# Patient Record
Sex: FEMALE | NOT HISPANIC OR LATINO
[De-identification: names, ages, dates, MRNs, and addresses within clinical notes are randomized per-mention and may not be internally consistent; named-entity substitution may affect disease eponyms.]

---

## 2019-02-05 LAB — CYTOLOGY CVX/VAG DOC THIN PREP: NORMAL

## 2019-02-11 ENCOUNTER — RECORD ABSTRACTING (OUTPATIENT)
Age: 54
End: 2019-02-11

## 2019-02-11 DIAGNOSIS — Z98.890 OTHER SPECIFIED POSTPROCEDURAL STATES: ICD-10-CM

## 2019-02-11 RX ORDER — ASPIRIN/ACETAMINOPHEN/CAFFEINE 250-250-65
325 (65 FE) TABLET ORAL DAILY
Refills: 0 | Status: ACTIVE | COMMUNITY

## 2019-02-11 RX ORDER — MAGNESIUM OXIDE 500 MG
500 TABLET ORAL
Refills: 0 | Status: ACTIVE | COMMUNITY

## 2019-02-11 RX ORDER — DOCOSAHEXAENOIC ACID 300 MG
50 MCG CAPSULE ORAL
Refills: 0 | Status: ACTIVE | COMMUNITY

## 2019-02-21 ENCOUNTER — APPOINTMENT (OUTPATIENT)
Dept: OBGYN | Facility: CLINIC | Age: 54
End: 2019-02-21
Payer: COMMERCIAL

## 2019-02-21 VITALS — SYSTOLIC BLOOD PRESSURE: 120 MMHG | WEIGHT: 145 LBS | DIASTOLIC BLOOD PRESSURE: 78 MMHG

## 2019-02-21 DIAGNOSIS — Z86.2 PERSONAL HISTORY OF DISEASES OF THE BLOOD AND BLOOD-FORMING ORGANS AND CERTAIN DISORDERS INVOLVING THE IMMUNE MECHANISM: ICD-10-CM

## 2019-02-21 DIAGNOSIS — Z78.9 OTHER SPECIFIED HEALTH STATUS: ICD-10-CM

## 2019-02-21 DIAGNOSIS — Z86.39 PERSONAL HISTORY OF OTHER ENDOCRINE, NUTRITIONAL AND METABOLIC DISEASE: ICD-10-CM

## 2019-02-21 DIAGNOSIS — Z83.3 FAMILY HISTORY OF DIABETES MELLITUS: ICD-10-CM

## 2019-02-21 DIAGNOSIS — Z82.49 FAMILY HISTORY OF ISCHEMIC HEART DISEASE AND OTHER DISEASES OF THE CIRCULATORY SYSTEM: ICD-10-CM

## 2019-02-21 DIAGNOSIS — Z86.79 PERSONAL HISTORY OF OTHER DISEASES OF THE CIRCULATORY SYSTEM: ICD-10-CM

## 2019-02-21 PROCEDURE — 99396 PREV VISIT EST AGE 40-64: CPT

## 2019-06-18 ENCOUNTER — TRANSCRIPTION ENCOUNTER (OUTPATIENT)
Age: 54
End: 2019-06-18

## 2020-02-27 ENCOUNTER — APPOINTMENT (OUTPATIENT)
Dept: OBGYN | Facility: CLINIC | Age: 55
End: 2020-02-27
Payer: COMMERCIAL

## 2020-02-27 VITALS
SYSTOLIC BLOOD PRESSURE: 118 MMHG | HEIGHT: 63 IN | WEIGHT: 123 LBS | BODY MASS INDEX: 21.79 KG/M2 | DIASTOLIC BLOOD PRESSURE: 72 MMHG

## 2020-02-27 PROCEDURE — 99396 PREV VISIT EST AGE 40-64: CPT

## 2020-02-27 RX ORDER — ATORVASTATIN CALCIUM 10 MG/1
10 TABLET, FILM COATED ORAL
Qty: 30 | Refills: 0 | Status: ACTIVE | COMMUNITY
Start: 2020-02-15

## 2020-06-10 ENCOUNTER — APPOINTMENT (OUTPATIENT)
Dept: INTERNAL MEDICINE | Facility: CLINIC | Age: 55
End: 2020-06-10
Payer: COMMERCIAL

## 2020-06-10 ENCOUNTER — NON-APPOINTMENT (OUTPATIENT)
Age: 55
End: 2020-06-10

## 2020-06-10 VITALS
HEIGHT: 63 IN | BODY MASS INDEX: 20.91 KG/M2 | HEART RATE: 76 BPM | DIASTOLIC BLOOD PRESSURE: 70 MMHG | SYSTOLIC BLOOD PRESSURE: 108 MMHG | OXYGEN SATURATION: 98 % | WEIGHT: 118 LBS

## 2020-06-10 PROCEDURE — 99386 PREV VISIT NEW AGE 40-64: CPT | Mod: 25

## 2020-06-10 PROCEDURE — 93000 ELECTROCARDIOGRAM COMPLETE: CPT | Mod: 59

## 2020-06-10 PROCEDURE — 36415 COLL VENOUS BLD VENIPUNCTURE: CPT

## 2020-06-10 PROCEDURE — G0442 ANNUAL ALCOHOL SCREEN 15 MIN: CPT

## 2020-06-10 RX ORDER — LOSARTAN POTASSIUM 100 MG/1
100 TABLET, FILM COATED ORAL DAILY
Qty: 90 | Refills: 0 | Status: ACTIVE | COMMUNITY
Start: 1900-01-01 | End: 1900-01-01

## 2020-06-17 ENCOUNTER — RESULT REVIEW (OUTPATIENT)
Age: 55
End: 2020-06-17

## 2020-06-18 LAB
25(OH)D3 SERPL-MCNC: 55.1 NG/ML
25(OH)D3 SERPL-MCNC: 60.2 NG/ML
ALBUMIN SERPL ELPH-MCNC: 4.6 G/DL
ALBUMIN SERPL ELPH-MCNC: 4.8 G/DL
ALP BLD-CCNC: 112 U/L
ALP BLD-CCNC: 117 U/L
ALT SERPL-CCNC: 13 U/L
ALT SERPL-CCNC: 15 U/L
ANION GAP SERPL CALC-SCNC: 14 MMOL/L
ANION GAP SERPL CALC-SCNC: 23 MMOL/L
AST SERPL-CCNC: 17 U/L
AST SERPL-CCNC: 18 U/L
BASOPHILS # BLD AUTO: 0.04 K/UL
BASOPHILS # BLD AUTO: 0.05 K/UL
BASOPHILS NFR BLD AUTO: 0.7 %
BASOPHILS NFR BLD AUTO: 0.9 %
BILIRUB SERPL-MCNC: 0.5 MG/DL
BILIRUB SERPL-MCNC: 0.5 MG/DL
BUN SERPL-MCNC: 11 MG/DL
BUN SERPL-MCNC: 11 MG/DL
CALCIUM SERPL-MCNC: 10.1 MG/DL
CALCIUM SERPL-MCNC: 9.8 MG/DL
CHLORIDE SERPL-SCNC: 103 MMOL/L
CHLORIDE SERPL-SCNC: 104 MMOL/L
CHOLEST SERPL-MCNC: 187 MG/DL
CHOLEST SERPL-MCNC: 190 MG/DL
CHOLEST/HDLC SERPL: 3.1 RATIO
CHOLEST/HDLC SERPL: 3.2 RATIO
CO2 SERPL-SCNC: 17 MMOL/L
CO2 SERPL-SCNC: 25 MMOL/L
CREAT SERPL-MCNC: 0.62 MG/DL
CREAT SERPL-MCNC: 0.68 MG/DL
EOSINOPHIL # BLD AUTO: 0.14 K/UL
EOSINOPHIL # BLD AUTO: 0.17 K/UL
EOSINOPHIL NFR BLD AUTO: 2.4 %
EOSINOPHIL NFR BLD AUTO: 2.8 %
ESTIMATED AVERAGE GLUCOSE: 140 MG/DL
ESTIMATED AVERAGE GLUCOSE: 146 MG/DL
GLUCOSE SERPL-MCNC: 114 MG/DL
GLUCOSE SERPL-MCNC: 116 MG/DL
HBA1C MFR BLD HPLC: 6.5 %
HBA1C MFR BLD HPLC: 6.7 %
HCT VFR BLD CALC: 44 %
HCT VFR BLD CALC: 45.2 %
HDLC SERPL-MCNC: 59 MG/DL
HDLC SERPL-MCNC: 61 MG/DL
HGB BLD-MCNC: 13.2 G/DL
HGB BLD-MCNC: 13.3 G/DL
IMM GRANULOCYTES NFR BLD AUTO: 0.2 %
IMM GRANULOCYTES NFR BLD AUTO: 0.2 %
LDLC SERPL CALC-MCNC: 108 MG/DL
LDLC SERPL CALC-MCNC: 108 MG/DL
LYMPHOCYTES # BLD AUTO: 1.52 K/UL
LYMPHOCYTES # BLD AUTO: 1.54 K/UL
LYMPHOCYTES NFR BLD AUTO: 25.5 %
LYMPHOCYTES NFR BLD AUTO: 26.4 %
MAN DIFF?: NORMAL
MAN DIFF?: NORMAL
MCHC RBC-ENTMCNC: 26.6 PG
MCHC RBC-ENTMCNC: 26.6 PG
MCHC RBC-ENTMCNC: 29.4 GM/DL
MCHC RBC-ENTMCNC: 30 GM/DL
MCV RBC AUTO: 88.5 FL
MCV RBC AUTO: 90.4 FL
MONOCYTES # BLD AUTO: 0.44 K/UL
MONOCYTES # BLD AUTO: 0.46 K/UL
MONOCYTES NFR BLD AUTO: 7.3 %
MONOCYTES NFR BLD AUTO: 8 %
NEUTROPHILS # BLD AUTO: 3.57 K/UL
NEUTROPHILS # BLD AUTO: 3.83 K/UL
NEUTROPHILS NFR BLD AUTO: 62.1 %
NEUTROPHILS NFR BLD AUTO: 63.5 %
PLATELET # BLD AUTO: 320 K/UL
PLATELET # BLD AUTO: 338 K/UL
POTASSIUM SERPL-SCNC: 5.1 MMOL/L
POTASSIUM SERPL-SCNC: 5.4 MMOL/L
PROT SERPL-MCNC: 6.9 G/DL
PROT SERPL-MCNC: 7.1 G/DL
RBC # BLD: 4.97 M/UL
RBC # BLD: 5 M/UL
RBC # FLD: 12.6 %
RBC # FLD: 12.8 %
SARS-COV-2 IGG SERPL IA-ACNC: <0.1 INDEX
SARS-COV-2 IGG SERPL QL IA: NEGATIVE
SODIUM SERPL-SCNC: 141 MMOL/L
SODIUM SERPL-SCNC: 144 MMOL/L
T4 SERPL-MCNC: 7.4 UG/DL
T4 SERPL-MCNC: 8.1 UG/DL
TRIGL SERPL-MCNC: 105 MG/DL
TRIGL SERPL-MCNC: 98 MG/DL
TSH SERPL-ACNC: 2.22 UIU/ML
TSH SERPL-ACNC: 2.35 UIU/ML
VIT B12 SERPL-MCNC: >2000 PG/ML
VIT B12 SERPL-MCNC: >2000 PG/ML
WBC # FLD AUTO: 5.75 K/UL
WBC # FLD AUTO: 6.03 K/UL

## 2020-06-24 ENCOUNTER — APPOINTMENT (OUTPATIENT)
Dept: INTERNAL MEDICINE | Facility: CLINIC | Age: 55
End: 2020-06-24
Payer: COMMERCIAL

## 2020-06-24 VITALS
HEIGHT: 63 IN | BODY MASS INDEX: 20.91 KG/M2 | HEART RATE: 72 BPM | OXYGEN SATURATION: 99 % | WEIGHT: 118 LBS | DIASTOLIC BLOOD PRESSURE: 62 MMHG | SYSTOLIC BLOOD PRESSURE: 106 MMHG

## 2020-06-24 PROCEDURE — 36415 COLL VENOUS BLD VENIPUNCTURE: CPT

## 2020-07-08 ENCOUNTER — TRANSCRIPTION ENCOUNTER (OUTPATIENT)
Age: 55
End: 2020-07-08

## 2020-07-22 LAB
HBV SURFACE AB SER QL: NONREACTIVE
HBV SURFACE AG SER QL: NONREACTIVE

## 2020-08-12 ENCOUNTER — APPOINTMENT (OUTPATIENT)
Dept: INTERNAL MEDICINE | Facility: CLINIC | Age: 55
End: 2020-08-12
Payer: COMMERCIAL

## 2020-08-12 DIAGNOSIS — Z11.59 ENCOUNTER FOR SCREENING FOR OTHER VIRAL DISEASES: ICD-10-CM

## 2020-08-12 PROCEDURE — 90471 IMMUNIZATION ADMIN: CPT

## 2020-08-12 PROCEDURE — 90636 HEP A/HEP B VACC ADULT IM: CPT

## 2020-08-13 PROBLEM — Z11.59 NEED FOR HEPATITIS B SCREENING TEST: Status: ACTIVE | Noted: 2020-06-25

## 2020-09-16 ENCOUNTER — APPOINTMENT (OUTPATIENT)
Dept: INTERNAL MEDICINE | Facility: CLINIC | Age: 55
End: 2020-09-16
Payer: COMMERCIAL

## 2020-09-16 VITALS
WEIGHT: 121 LBS | OXYGEN SATURATION: 98 % | BODY MASS INDEX: 21.44 KG/M2 | HEIGHT: 63 IN | SYSTOLIC BLOOD PRESSURE: 108 MMHG | DIASTOLIC BLOOD PRESSURE: 78 MMHG | HEART RATE: 98 BPM

## 2020-09-16 DIAGNOSIS — Z23 ENCOUNTER FOR IMMUNIZATION: ICD-10-CM

## 2020-09-16 PROCEDURE — 99214 OFFICE O/P EST MOD 30 MIN: CPT | Mod: 25

## 2020-09-16 PROCEDURE — 36415 COLL VENOUS BLD VENIPUNCTURE: CPT

## 2020-09-17 PROBLEM — Z23 ENCOUNTER FOR IMMUNIZATION: Status: ACTIVE | Noted: 2020-08-12

## 2020-09-22 LAB
ALBUMIN SERPL ELPH-MCNC: 4.6 G/DL
ALP BLD-CCNC: 95 U/L
ALT SERPL-CCNC: 10 U/L
ANION GAP SERPL CALC-SCNC: 12 MMOL/L
APPEARANCE: CLEAR
AST SERPL-CCNC: 12 U/L
BACTERIA: NEGATIVE
BASOPHILS # BLD AUTO: 0.05 K/UL
BASOPHILS NFR BLD AUTO: 0.8 %
BILIRUB SERPL-MCNC: 0.4 MG/DL
BILIRUBIN URINE: NEGATIVE
BLOOD URINE: NEGATIVE
BUN SERPL-MCNC: 11 MG/DL
CALCIUM SERPL-MCNC: 9.5 MG/DL
CHLORIDE SERPL-SCNC: 104 MMOL/L
CO2 SERPL-SCNC: 24 MMOL/L
COLOR: NORMAL
CREAT SERPL-MCNC: 0.58 MG/DL
EOSINOPHIL # BLD AUTO: 0.18 K/UL
EOSINOPHIL NFR BLD AUTO: 2.8 %
ESTIMATED AVERAGE GLUCOSE: 143 MG/DL
GLUCOSE QUALITATIVE U: ABNORMAL
GLUCOSE SERPL-MCNC: 109 MG/DL
HBA1C MFR BLD HPLC: 6.6 %
HCT VFR BLD CALC: 43.2 %
HGB BLD-MCNC: 13.2 G/DL
HYALINE CASTS: 1 /LPF
IMM GRANULOCYTES NFR BLD AUTO: 0.3 %
KETONES URINE: NEGATIVE
LEUKOCYTE ESTERASE URINE: ABNORMAL
LYMPHOCYTES # BLD AUTO: 1.75 K/UL
LYMPHOCYTES NFR BLD AUTO: 27.6 %
MAN DIFF?: NORMAL
MCHC RBC-ENTMCNC: 27 PG
MCHC RBC-ENTMCNC: 30.6 GM/DL
MCV RBC AUTO: 88.3 FL
MICROSCOPIC-UA: NORMAL
MONOCYTES # BLD AUTO: 0.45 K/UL
MONOCYTES NFR BLD AUTO: 7.1 %
NEUTROPHILS # BLD AUTO: 3.88 K/UL
NEUTROPHILS NFR BLD AUTO: 61.4 %
NITRITE URINE: NEGATIVE
PH URINE: 5
PLATELET # BLD AUTO: 308 K/UL
POTASSIUM SERPL-SCNC: 4.7 MMOL/L
PROT SERPL-MCNC: 6.4 G/DL
PROTEIN URINE: NEGATIVE
RBC # BLD: 4.89 M/UL
RBC # FLD: 12.6 %
RED BLOOD CELLS URINE: 1 /HPF
SODIUM SERPL-SCNC: 140 MMOL/L
SPECIFIC GRAVITY URINE: 1.02
SQUAMOUS EPITHELIAL CELLS: 1 /HPF
UROBILINOGEN URINE: NORMAL
WBC # FLD AUTO: 6.33 K/UL
WHITE BLOOD CELLS URINE: 4 /HPF

## 2020-12-17 ENCOUNTER — TRANSCRIPTION ENCOUNTER (OUTPATIENT)
Age: 55
End: 2020-12-17

## 2020-12-20 ENCOUNTER — TRANSCRIPTION ENCOUNTER (OUTPATIENT)
Age: 55
End: 2020-12-20

## 2021-01-20 ENCOUNTER — TRANSCRIPTION ENCOUNTER (OUTPATIENT)
Age: 56
End: 2021-01-20

## 2021-03-04 ENCOUNTER — APPOINTMENT (OUTPATIENT)
Dept: OBGYN | Facility: CLINIC | Age: 56
End: 2021-03-04
Payer: COMMERCIAL

## 2021-03-04 VITALS
DIASTOLIC BLOOD PRESSURE: 80 MMHG | HEIGHT: 63 IN | WEIGHT: 122 LBS | SYSTOLIC BLOOD PRESSURE: 110 MMHG | TEMPERATURE: 97.6 F | BODY MASS INDEX: 21.62 KG/M2

## 2021-03-04 DIAGNOSIS — Z12.31 ENCOUNTER FOR SCREENING MAMMOGRAM FOR MALIGNANT NEOPLASM OF BREAST: ICD-10-CM

## 2021-03-04 DIAGNOSIS — R92.2 INCONCLUSIVE MAMMOGRAM: ICD-10-CM

## 2021-03-04 PROCEDURE — 99072 ADDL SUPL MATRL&STAF TM PHE: CPT

## 2021-03-04 PROCEDURE — 99396 PREV VISIT EST AGE 40-64: CPT

## 2021-03-08 LAB
CYTOLOGY CVX/VAG DOC THIN PREP: ABNORMAL
HPV HIGH+LOW RISK DNA PNL CVX: NOT DETECTED

## 2021-03-30 ENCOUNTER — APPOINTMENT (OUTPATIENT)
Dept: INTERNAL MEDICINE | Facility: CLINIC | Age: 56
End: 2021-03-30

## 2021-06-30 ENCOUNTER — APPOINTMENT (OUTPATIENT)
Dept: INTERNAL MEDICINE | Facility: CLINIC | Age: 56
End: 2021-06-30
Payer: COMMERCIAL

## 2021-06-30 ENCOUNTER — NON-APPOINTMENT (OUTPATIENT)
Age: 56
End: 2021-06-30

## 2021-06-30 VITALS
WEIGHT: 122 LBS | SYSTOLIC BLOOD PRESSURE: 120 MMHG | OXYGEN SATURATION: 98 % | HEART RATE: 87 BPM | DIASTOLIC BLOOD PRESSURE: 80 MMHG | BODY MASS INDEX: 21.62 KG/M2 | HEIGHT: 63 IN

## 2021-06-30 DIAGNOSIS — M85.80 OTHER SPECIFIED DISORDERS OF BONE DENSITY AND STRUCTURE, UNSPECIFIED SITE: ICD-10-CM

## 2021-06-30 DIAGNOSIS — H66.90 OTITIS MEDIA, UNSPECIFIED, UNSPECIFIED EAR: ICD-10-CM

## 2021-06-30 PROCEDURE — 93000 ELECTROCARDIOGRAM COMPLETE: CPT | Mod: 59

## 2021-06-30 PROCEDURE — 99396 PREV VISIT EST AGE 40-64: CPT | Mod: 25

## 2021-06-30 PROCEDURE — 36415 COLL VENOUS BLD VENIPUNCTURE: CPT

## 2021-06-30 PROCEDURE — 99072 ADDL SUPL MATRL&STAF TM PHE: CPT

## 2021-07-01 NOTE — HEALTH RISK ASSESSMENT
[No] : No [Never (0 pts)] : Never (0 points) [No falls in past year] : Patient reported no falls in the past year [0] : 2) Feeling down, depressed, or hopeless: Not at all (0) [Patient reported mammogram was normal] : Patient reported mammogram was normal [Patient reported colonoscopy was normal] : Patient reported colonoscopy was normal [] : No [Audit-CScore] : 0 [FreeTextEntry1] : 0 [[id number]] : 0 [[id number]] : 0 [MammogramDate] : 06/20 [ColonoscopyDate] : 01/15

## 2021-07-01 NOTE — HISTORY OF PRESENT ILLNESS
[FreeTextEntry1] : Annual  [de-identified] : Left earache,  and headache since 10 am yesterday..  Rates earache 7/10, headache 5/10.\par \par Chronic issues with pain in neck, shoulders, back, knees with work as dentist. Has tried physcial therapist, cupping, \par \par Occasional SOB and chest pain, follows with Cardiologist in [place] [employer].  She has had 3 stress test, echo, angio all WNL per pt. If chest pain continues cardiologist recommended CTA of heart. \par \par C/o left leg swelling,  + varicose veins, wears compression stocking daily.  \par \par Recent insomnia last 2 months believes it was from Jardiance.  Has not tried melatonin. \par \par Mammo june /2020\par \par Colon 5 years ago , due in 5 years. \par \par Eats a vegetarian diet, grows her own vegetables.  \par \par  lives in [place], working as Dentist in [place], Has twin girls 23 yearsboth  living away from home.

## 2021-07-07 ENCOUNTER — RESULT REVIEW (OUTPATIENT)
Age: 56
End: 2021-07-07

## 2021-09-29 ENCOUNTER — APPOINTMENT (OUTPATIENT)
Dept: INTERNAL MEDICINE | Facility: CLINIC | Age: 56
End: 2021-09-29
Payer: COMMERCIAL

## 2021-09-29 VITALS
HEIGHT: 63 IN | BODY MASS INDEX: 21.79 KG/M2 | OXYGEN SATURATION: 100 % | HEART RATE: 75 BPM | DIASTOLIC BLOOD PRESSURE: 70 MMHG | WEIGHT: 123 LBS | SYSTOLIC BLOOD PRESSURE: 130 MMHG

## 2021-09-29 DIAGNOSIS — R07.89 OTHER CHEST PAIN: ICD-10-CM

## 2021-09-29 PROCEDURE — 93000 ELECTROCARDIOGRAM COMPLETE: CPT

## 2021-09-29 PROCEDURE — 36415 COLL VENOUS BLD VENIPUNCTURE: CPT

## 2021-09-29 PROCEDURE — 99214 OFFICE O/P EST MOD 30 MIN: CPT | Mod: 25

## 2021-09-30 PROBLEM — R07.89 MUSCULOSKELETAL CHEST PAIN: Status: ACTIVE | Noted: 2021-09-30

## 2021-09-30 NOTE — HISTORY OF PRESENT ILLNESS
[FreeTextEntry1] : 1. blood work for DM\par 2, patient has started PT for her left shoulder, today woke up with chest pain and arm pain.  [de-identified] : 1. patient woke up with chest pain and arm pain. Pain is reproducible with palpation and movement. She did report PT was aggressive. She is however always worried about her heart because of her family history of severe cardiac disease

## 2021-09-30 NOTE — PLAN
[FreeTextEntry1] : patient should go follow up with her cardiologist to monitor her family history of cardiovasular disease\par \par \par Current chest pain is musculoskeletal in nature, however she was told if pain returns and is severe to go to the ER.

## 2021-10-13 ENCOUNTER — LABORATORY RESULT (OUTPATIENT)
Age: 56
End: 2021-10-13

## 2021-10-13 ENCOUNTER — APPOINTMENT (OUTPATIENT)
Dept: INTERNAL MEDICINE | Facility: CLINIC | Age: 56
End: 2021-10-13
Payer: COMMERCIAL

## 2021-10-13 VITALS
TEMPERATURE: 98.2 F | BODY MASS INDEX: 21.79 KG/M2 | WEIGHT: 123 LBS | HEIGHT: 63 IN | HEART RATE: 85 BPM | SYSTOLIC BLOOD PRESSURE: 110 MMHG | DIASTOLIC BLOOD PRESSURE: 72 MMHG | OXYGEN SATURATION: 98 %

## 2021-10-13 DIAGNOSIS — R21 RASH AND OTHER NONSPECIFIC SKIN ERUPTION: ICD-10-CM

## 2021-10-13 DIAGNOSIS — L08.9 LOCAL INFECTION OF THE SKIN AND SUBCUTANEOUS TISSUE, UNSPECIFIED: ICD-10-CM

## 2021-10-13 DIAGNOSIS — A69.20 LYME DISEASE, UNSPECIFIED: ICD-10-CM

## 2021-10-13 PROCEDURE — 99214 OFFICE O/P EST MOD 30 MIN: CPT

## 2021-10-14 PROBLEM — A69.20 LYME DISEASE: Status: ACTIVE | Noted: 2021-10-13

## 2021-10-14 PROBLEM — L08.9 SKIN INFECTION: Status: ACTIVE | Noted: 2021-10-14

## 2021-10-15 LAB — B BURGDOR IGG+IGM SER QL IB: NORMAL

## 2022-01-19 ENCOUNTER — APPOINTMENT (OUTPATIENT)
Dept: INTERNAL MEDICINE | Facility: CLINIC | Age: 57
End: 2022-01-19
Payer: COMMERCIAL

## 2022-01-19 VITALS
HEIGHT: 63 IN | OXYGEN SATURATION: 98 % | SYSTOLIC BLOOD PRESSURE: 110 MMHG | HEART RATE: 70 BPM | BODY MASS INDEX: 21.79 KG/M2 | DIASTOLIC BLOOD PRESSURE: 78 MMHG | WEIGHT: 123 LBS

## 2022-01-19 DIAGNOSIS — D64.9 ANEMIA, UNSPECIFIED: ICD-10-CM

## 2022-01-19 PROCEDURE — 36415 COLL VENOUS BLD VENIPUNCTURE: CPT

## 2022-01-19 PROCEDURE — 99214 OFFICE O/P EST MOD 30 MIN: CPT | Mod: 25

## 2022-01-20 PROBLEM — D64.9 ANEMIA: Status: ACTIVE | Noted: 2019-02-11

## 2022-01-20 NOTE — HISTORY OF PRESENT ILLNESS
[FreeTextEntry1] : blood for for DM2 [de-identified] : 1. blood work for DM2\par 2. patient says she did not get a mammogram this year and that i didn’t not remind her however at her last annual of 2021 a script was handed to patient for imaging and was documented \par 3. sugars have been elevated after cortisone injection of the left shoulder.

## 2022-06-22 ENCOUNTER — APPOINTMENT (OUTPATIENT)
Dept: OBGYN | Facility: CLINIC | Age: 57
End: 2022-06-22

## 2022-08-31 ENCOUNTER — APPOINTMENT (OUTPATIENT)
Dept: INTERNAL MEDICINE | Facility: CLINIC | Age: 57
End: 2022-08-31

## 2022-08-31 VITALS
DIASTOLIC BLOOD PRESSURE: 70 MMHG | SYSTOLIC BLOOD PRESSURE: 110 MMHG | HEIGHT: 63 IN | WEIGHT: 122 LBS | OXYGEN SATURATION: 98 % | BODY MASS INDEX: 21.62 KG/M2 | HEART RATE: 88 BPM

## 2022-08-31 DIAGNOSIS — R10.819 ABDOMINAL TENDERNESS, UNSPECIFIED SITE: ICD-10-CM

## 2022-08-31 PROCEDURE — 99214 OFFICE O/P EST MOD 30 MIN: CPT | Mod: 25

## 2022-08-31 PROCEDURE — 36415 COLL VENOUS BLD VENIPUNCTURE: CPT

## 2022-09-01 NOTE — HISTORY OF PRESENT ILLNESS
[FreeTextEntry1] : tender abdomen [de-identified] : 1. abdominal tenderness\par 2. painful to touch\par 3. on and of cramping with nausea\par \par refusal to go to ER \par \par 4. diabetes work up

## 2022-11-02 ENCOUNTER — APPOINTMENT (OUTPATIENT)
Dept: INTERNAL MEDICINE | Facility: CLINIC | Age: 57
End: 2022-11-02

## 2022-11-02 VITALS
HEIGHT: 63 IN | WEIGHT: 121 LBS | DIASTOLIC BLOOD PRESSURE: 70 MMHG | BODY MASS INDEX: 21.44 KG/M2 | HEART RATE: 84 BPM | OXYGEN SATURATION: 98 % | RESPIRATION RATE: 16 BRPM | SYSTOLIC BLOOD PRESSURE: 122 MMHG

## 2022-11-02 PROCEDURE — 99396 PREV VISIT EST AGE 40-64: CPT | Mod: 25

## 2022-11-02 PROCEDURE — G0444 DEPRESSION SCREEN ANNUAL: CPT | Mod: 59

## 2022-11-02 PROCEDURE — 36415 COLL VENOUS BLD VENIPUNCTURE: CPT

## 2022-11-08 ENCOUNTER — MED ADMIN CHARGE (OUTPATIENT)
Age: 57
End: 2022-11-08

## 2022-11-23 DIAGNOSIS — M62.838 OTHER MUSCLE SPASM: ICD-10-CM

## 2022-12-22 RX ORDER — EMPAGLIFLOZIN 10 MG/1
10 TABLET, FILM COATED ORAL DAILY
Qty: 90 | Refills: 2 | Status: ACTIVE | COMMUNITY
Start: 2021-06-30 | End: 1900-01-01

## 2023-05-03 ENCOUNTER — APPOINTMENT (OUTPATIENT)
Dept: INTERNAL MEDICINE | Facility: CLINIC | Age: 58
End: 2023-05-03
Payer: COMMERCIAL

## 2023-05-03 VITALS
TEMPERATURE: 97.8 F | HEIGHT: 62 IN | OXYGEN SATURATION: 97 % | HEART RATE: 68 BPM | SYSTOLIC BLOOD PRESSURE: 126 MMHG | WEIGHT: 116 LBS | RESPIRATION RATE: 16 BRPM | BODY MASS INDEX: 21.35 KG/M2 | DIASTOLIC BLOOD PRESSURE: 78 MMHG

## 2023-05-03 DIAGNOSIS — R10.819 ABDOMINAL TENDERNESS, UNSPECIFIED SITE: ICD-10-CM

## 2023-05-03 DIAGNOSIS — M81.0 AGE-RELATED OSTEOPOROSIS W/OUT CURRENT PATHOLOGICAL FRACTURE: ICD-10-CM

## 2023-05-03 DIAGNOSIS — R63.4 ABNORMAL WEIGHT LOSS: ICD-10-CM

## 2023-05-03 DIAGNOSIS — E55.9 VITAMIN D DEFICIENCY, UNSPECIFIED: ICD-10-CM

## 2023-05-03 PROCEDURE — G0444 DEPRESSION SCREEN ANNUAL: CPT | Mod: 59

## 2023-05-03 PROCEDURE — 36415 COLL VENOUS BLD VENIPUNCTURE: CPT

## 2023-05-03 PROCEDURE — 99214 OFFICE O/P EST MOD 30 MIN: CPT | Mod: 25

## 2023-05-04 NOTE — HEALTH RISK ASSESSMENT
[No] : No [0] : 2) Feeling down, depressed, or hopeless: Not at all (0) [PHQ-2 Negative - No further assessment needed] : PHQ-2 Negative - No further assessment needed [Never] : Never [[id number]] : 0

## 2023-05-04 NOTE — HISTORY OF PRESENT ILLNESS
[FreeTextEntry1] : Blood work [de-identified] : 1. follow up exam \par 2.  Is up with endocrinologist and reports she is compliant with medication., \par 3. follows breast specialist \par 4. up to date on mammograms and colonoscopies \par 5.  Patient complains of an on and off tender abdomen.  She also reports of increased weight loss.  She reports she has not lost over 10 pounds in a very short period of time.  She would like to get an abdominal ultrasound.  To make sure that nothing is going on.

## 2023-05-18 LAB
25(OH)D3 SERPL-MCNC: 47.8 NG/ML
ALBUMIN SERPL ELPH-MCNC: 4.4 G/DL
ALP BLD-CCNC: 89 U/L
ALT SERPL-CCNC: 12 U/L
ANION GAP SERPL CALC-SCNC: 14 MMOL/L
APPEARANCE: CLEAR
AST SERPL-CCNC: 13 U/L
BASOPHILS # BLD AUTO: 0.05 K/UL
BASOPHILS NFR BLD AUTO: 0.8 %
BILIRUB SERPL-MCNC: 0.4 MG/DL
BILIRUBIN URINE: NEGATIVE
BLOOD URINE: NEGATIVE
BUN SERPL-MCNC: 12 MG/DL
CALCIUM SERPL-MCNC: 9.8 MG/DL
CHLORIDE SERPL-SCNC: 103 MMOL/L
CHOLEST SERPL-MCNC: 159 MG/DL
CO2 SERPL-SCNC: 22 MMOL/L
COLOR: YELLOW
CREAT SERPL-MCNC: 0.57 MG/DL
EGFR: 106 ML/MIN/1.73M2
EOSINOPHIL # BLD AUTO: 0.12 K/UL
EOSINOPHIL NFR BLD AUTO: 2 %
ESTIMATED AVERAGE GLUCOSE: 146 MG/DL
GLUCOSE QUALITATIVE U: >=1000 MG/DL
GLUCOSE SERPL-MCNC: 119 MG/DL
HBA1C MFR BLD HPLC: 6.7 %
HCT VFR BLD CALC: 41.9 %
HDLC SERPL-MCNC: 56 MG/DL
HGB BLD-MCNC: 13 G/DL
IMM GRANULOCYTES NFR BLD AUTO: 0.3 %
KETONES URINE: NEGATIVE MG/DL
LDLC SERPL CALC-MCNC: 79 MG/DL
LEUKOCYTE ESTERASE URINE: NEGATIVE
LYMPHOCYTES # BLD AUTO: 1.6 K/UL
LYMPHOCYTES NFR BLD AUTO: 26.9 %
MAN DIFF?: NORMAL
MCHC RBC-ENTMCNC: 27.8 PG
MCHC RBC-ENTMCNC: 31 GM/DL
MCV RBC AUTO: 89.7 FL
MONOCYTES # BLD AUTO: 0.43 K/UL
MONOCYTES NFR BLD AUTO: 7.2 %
NEUTROPHILS # BLD AUTO: 3.72 K/UL
NEUTROPHILS NFR BLD AUTO: 62.8 %
NITRITE URINE: NEGATIVE
NONHDLC SERPL-MCNC: 103 MG/DL
PH URINE: 5.5
PLATELET # BLD AUTO: 301 K/UL
POTASSIUM SERPL-SCNC: 4.5 MMOL/L
PROT SERPL-MCNC: 6.4 G/DL
PROTEIN URINE: NEGATIVE MG/DL
RBC # BLD: 4.67 M/UL
RBC # FLD: 12.4 %
SODIUM SERPL-SCNC: 140 MMOL/L
SPECIFIC GRAVITY URINE: 1.03
T4 SERPL-MCNC: 8.3 UG/DL
TRIGL SERPL-MCNC: 116 MG/DL
TSH SERPL-ACNC: 2.36 UIU/ML
UROBILINOGEN URINE: 0.2 MG/DL
VIT B12 SERPL-MCNC: >2000 PG/ML
WBC # FLD AUTO: 5.94 K/UL

## 2023-11-01 ENCOUNTER — APPOINTMENT (OUTPATIENT)
Dept: INTERNAL MEDICINE | Facility: CLINIC | Age: 58
End: 2023-11-01
Payer: COMMERCIAL

## 2023-11-01 VITALS
SYSTOLIC BLOOD PRESSURE: 116 MMHG | OXYGEN SATURATION: 98 % | HEART RATE: 66 BPM | DIASTOLIC BLOOD PRESSURE: 70 MMHG | HEIGHT: 62 IN | BODY MASS INDEX: 21.53 KG/M2 | RESPIRATION RATE: 16 BRPM | WEIGHT: 117 LBS | TEMPERATURE: 98.7 F

## 2023-11-01 DIAGNOSIS — I10 ESSENTIAL (PRIMARY) HYPERTENSION: ICD-10-CM

## 2023-11-01 DIAGNOSIS — E11.9 TYPE 2 DIABETES MELLITUS W/OUT COMPLICATIONS: ICD-10-CM

## 2023-11-01 DIAGNOSIS — K21.9 GASTRO-ESOPHAGEAL REFLUX DISEASE W/OUT ESOPHAGITIS: ICD-10-CM

## 2023-11-01 DIAGNOSIS — Z00.00 ENCOUNTER FOR GENERAL ADULT MEDICAL EXAMINATION W/OUT ABNORMAL FINDINGS: ICD-10-CM

## 2023-11-01 PROCEDURE — 99214 OFFICE O/P EST MOD 30 MIN: CPT | Mod: 25

## 2023-11-01 PROCEDURE — 36415 COLL VENOUS BLD VENIPUNCTURE: CPT

## 2023-11-01 RX ORDER — PANTOPRAZOLE 40 MG/1
40 TABLET, DELAYED RELEASE ORAL
Qty: 90 | Refills: 3 | Status: ACTIVE | COMMUNITY
Start: 2023-11-01 | End: 1900-01-01

## 2023-11-01 RX ORDER — CIPROFLOXACIN AND DEXAMETHASONE 3; 1 MG/ML; MG/ML
0.3-0.1 SUSPENSION/ DROPS AURICULAR (OTIC) TWICE DAILY
Qty: 1 | Refills: 0 | Status: DISCONTINUED | COMMUNITY
Start: 2021-06-30 | End: 2023-11-01

## 2023-11-01 RX ORDER — NAPROXEN 250 MG/1
250 TABLET ORAL
Qty: 20 | Refills: 0 | Status: DISCONTINUED | COMMUNITY
Start: 2021-09-29 | End: 2023-11-01

## 2023-11-01 RX ORDER — LOSARTAN POTASSIUM 100 MG/1
100 TABLET, FILM COATED ORAL
Qty: 90 | Refills: 3 | Status: DISCONTINUED | COMMUNITY
Start: 2021-06-30 | End: 2023-11-01

## 2023-11-01 RX ORDER — CYCLOBENZAPRINE HYDROCHLORIDE 10 MG/1
10 TABLET, FILM COATED ORAL 3 TIMES DAILY
Qty: 15 | Refills: 0 | Status: DISCONTINUED | COMMUNITY
Start: 2022-11-23 | End: 2023-11-01

## 2023-11-01 RX ORDER — AZITHROMYCIN 250 MG/1
250 TABLET, FILM COATED ORAL
Qty: 1 | Refills: 0 | Status: DISCONTINUED | COMMUNITY
Start: 2021-06-30 | End: 2023-11-01

## 2023-11-01 RX ORDER — CEPHALEXIN 500 MG/1
500 CAPSULE ORAL 3 TIMES DAILY
Qty: 21 | Refills: 0 | Status: DISCONTINUED | COMMUNITY
Start: 2021-10-13 | End: 2023-11-01

## 2023-11-01 RX ORDER — CYCLOBENZAPRINE HYDROCHLORIDE 10 MG/1
10 TABLET, FILM COATED ORAL
Qty: 21 | Refills: 0 | Status: DISCONTINUED | COMMUNITY
Start: 2020-01-21 | End: 2023-11-01

## 2023-11-01 RX ORDER — CEPHALEXIN 500 MG/1
500 TABLET ORAL 3 TIMES DAILY
Qty: 21 | Refills: 0 | Status: DISCONTINUED | COMMUNITY
Start: 2021-10-13 | End: 2023-11-01

## 2023-11-01 RX ORDER — ACETAMINOPHEN AND DIPHENHYDRAMINE HCL 500; 25 MG/1; MG/1
TABLET, FILM COATED ORAL
Refills: 0 | Status: DISCONTINUED | COMMUNITY
End: 2023-11-01

## 2023-11-01 RX ORDER — NAPROXEN 250 MG/1
250 TABLET ORAL
Qty: 14 | Refills: 0 | Status: DISCONTINUED | COMMUNITY
Start: 2022-11-23 | End: 2023-11-01

## 2023-11-01 RX ORDER — AZITHROMYCIN 250 MG/1
250 TABLET, FILM COATED ORAL
Qty: 5 | Refills: 0 | Status: DISCONTINUED | COMMUNITY
Start: 2021-06-30 | End: 2023-11-01

## 2023-11-01 RX ORDER — TRAMADOL HYDROCHLORIDE AND ACETAMINOPHEN 37.5; 325 MG/1; MG/1
37.5-325 TABLET, FILM COATED ORAL
Qty: 32 | Refills: 0 | Status: DISCONTINUED | COMMUNITY
Start: 2020-01-23 | End: 2023-11-01

## 2023-11-02 LAB — HBA1C MFR BLD HPLC: 6.7

## 2023-11-03 ENCOUNTER — TRANSCRIPTION ENCOUNTER (OUTPATIENT)
Age: 58
End: 2023-11-03

## 2023-11-03 LAB
ALBUMIN SERPL ELPH-MCNC: 4.3 G/DL
ALP BLD-CCNC: 85 U/L
ALT SERPL-CCNC: 16 U/L
ANION GAP SERPL CALC-SCNC: 12 MMOL/L
APPEARANCE: CLEAR
AST SERPL-CCNC: 16 U/L
BILIRUB SERPL-MCNC: 0.4 MG/DL
BILIRUBIN URINE: NEGATIVE
BLOOD URINE: NEGATIVE
BUN SERPL-MCNC: 13 MG/DL
CALCIUM SERPL-MCNC: 9.6 MG/DL
CHLORIDE SERPL-SCNC: 105 MMOL/L
CHOLEST SERPL-MCNC: 164 MG/DL
CO2 SERPL-SCNC: 24 MMOL/L
COLOR: YELLOW
CREAT SERPL-MCNC: 0.59 MG/DL
EGFR: 104 ML/MIN/1.73M2
ESTIMATED AVERAGE GLUCOSE: 151 MG/DL
GLUCOSE QUALITATIVE U: >=1000 MG/DL
GLUCOSE SERPL-MCNC: 114 MG/DL
HBA1C MFR BLD HPLC: 6.9 %
HDLC SERPL-MCNC: 57 MG/DL
KETONES URINE: NEGATIVE MG/DL
LDLC SERPL CALC-MCNC: 88 MG/DL
LEUKOCYTE ESTERASE URINE: NEGATIVE
NITRITE URINE: NEGATIVE
NONHDLC SERPL-MCNC: 108 MG/DL
PH URINE: 5.5
POTASSIUM SERPL-SCNC: 4.3 MMOL/L
PROT SERPL-MCNC: 6.4 G/DL
PROTEIN URINE: NEGATIVE MG/DL
SODIUM SERPL-SCNC: 141 MMOL/L
SPECIFIC GRAVITY URINE: >1.03
T4 SERPL-MCNC: 7.5 UG/DL
TRIGL SERPL-MCNC: 108 MG/DL
TSH SERPL-ACNC: 1.98 UIU/ML
UROBILINOGEN URINE: 0.2 MG/DL
VIT B12 SERPL-MCNC: >2000 PG/ML

## 2023-12-12 DIAGNOSIS — J01.40 ACUTE PANSINUSITIS, UNSPECIFIED: ICD-10-CM

## 2023-12-12 DIAGNOSIS — R92.30 INCONCLUSIVE MAMMOGRAM: ICD-10-CM

## 2023-12-12 DIAGNOSIS — R92.2 INCONCLUSIVE MAMMOGRAM: ICD-10-CM

## 2023-12-12 DIAGNOSIS — R92.8 OTHER ABNORMAL AND INCONCLUSIVE FINDINGS ON DIAGNOSTIC IMAGING OF BREAST: ICD-10-CM

## 2023-12-12 RX ORDER — AMOXICILLIN AND CLAVULANATE POTASSIUM 875; 125 MG/1; MG/1
875-125 TABLET, COATED ORAL
Qty: 14 | Refills: 0 | Status: ACTIVE | COMMUNITY
Start: 2023-12-12 | End: 1900-01-01

## 2024-01-17 ENCOUNTER — APPOINTMENT (OUTPATIENT)
Dept: OBGYN | Facility: CLINIC | Age: 59
End: 2024-01-17
Payer: COMMERCIAL

## 2024-01-17 VITALS
HEIGHT: 62 IN | DIASTOLIC BLOOD PRESSURE: 60 MMHG | WEIGHT: 116 LBS | SYSTOLIC BLOOD PRESSURE: 103 MMHG | BODY MASS INDEX: 21.35 KG/M2

## 2024-01-17 PROCEDURE — 99213 OFFICE O/P EST LOW 20 MIN: CPT

## 2024-01-17 RX ORDER — CLINDAMYCIN PHOSPHATE 10 MG/ML
1 LOTION TOPICAL TWICE DAILY
Qty: 1 | Refills: 0 | Status: ACTIVE | COMMUNITY
Start: 2024-01-17 | End: 1900-01-01

## 2024-01-17 NOTE — PHYSICAL EXAM
[Chaperone Present] : A chaperone was present in the examining room during all aspects of the physical examination [Appropriately responsive] : appropriately responsive [Alert] : alert [No Acute Distress] : no acute distress [Oriented x3] : oriented x3 [Labia Majora] : normal [Labia Minora] : normal [Normal] :  normal [FreeTextEntry1] : 1-2cm hyperpigmented macule with unroofed vesicles in perianal/gluteal region on right (~7oc). No drainage, tender to touch.

## 2024-01-17 NOTE — HISTORY OF PRESENT ILLNESS
[FreeTextEntry1] : 57 y/o postmenopausal  here for ?abscess on vagina.  First appeared 11/23 - at same time had viral-like illness. Went away with warm compresses.  Re-appeared 10 days ago.  Started as blister appearing lesion.  Painful to touch.  First time had lesions like this.

## 2024-01-24 LAB — BACTERIA SPEC CULT: ABNORMAL

## 2024-02-23 ENCOUNTER — APPOINTMENT (OUTPATIENT)
Dept: OBGYN | Facility: CLINIC | Age: 59
End: 2024-02-23
Payer: COMMERCIAL

## 2024-02-23 VITALS
BODY MASS INDEX: 21.71 KG/M2 | SYSTOLIC BLOOD PRESSURE: 102 MMHG | WEIGHT: 118 LBS | HEIGHT: 62 IN | DIASTOLIC BLOOD PRESSURE: 70 MMHG

## 2024-02-23 DIAGNOSIS — Z01.419 ENCOUNTER FOR GYNECOLOGICAL EXAMINATION (GENERAL) (ROUTINE) W/OUT ABNORMAL FINDINGS: ICD-10-CM

## 2024-02-23 PROCEDURE — 99396 PREV VISIT EST AGE 40-64: CPT

## 2024-02-23 NOTE — PHYSICAL EXAM
[Chaperone Present] : A chaperone was present in the examining room during all aspects of the physical examination [Appropriately responsive] : appropriately responsive [Alert] : alert [Soft] : soft [No Acute Distress] : no acute distress [Non-tender] : non-tender [Non-distended] : non-distended [Oriented x3] : oriented x3 [Examination Of The Breasts] : a normal appearance [No Masses] : no breast masses were palpable [No Discharge] : no discharge [Labia Minora] : normal [Labia Majora] : normal [Atrophy] : atrophy [Normal] : normal [FreeTextEntry1] : 1cm raised hyperpigmented lesion, tender to touch. No erythema. Located at ~7oc on perineal/gluteal region

## 2024-02-23 NOTE — HISTORY OF PRESENT ILLNESS
[FreeTextEntry1] : 57 y/o postmenopausal  ere for annual and f/up.  LMP 2017, no episodes of PMB.  Seen last month for lesion on vagina - cultures taken to r/o HSV vs folliculitis; culture positive for staph consistent with folliculitis. Was given topical clinca.  Helped some but still there and bothers her when clothing rubs on area.  Mammo done 11/2023 at [facility] - birads 0, was notified needs to bring discs from other mammo for comparision; she is going to do that soon.  Has PE coming up with PMD.  PAP 2021 NILM/neg HPV.

## 2024-04-03 ENCOUNTER — APPOINTMENT (OUTPATIENT)
Dept: INTERNAL MEDICINE | Facility: CLINIC | Age: 59
End: 2024-04-03

## 2024-04-17 ENCOUNTER — APPOINTMENT (OUTPATIENT)
Dept: OBGYN | Facility: CLINIC | Age: 59
End: 2024-04-17
Payer: COMMERCIAL

## 2024-04-17 VITALS
BODY MASS INDEX: 21.9 KG/M2 | HEIGHT: 62 IN | DIASTOLIC BLOOD PRESSURE: 60 MMHG | WEIGHT: 119 LBS | SYSTOLIC BLOOD PRESSURE: 116 MMHG

## 2024-04-17 DIAGNOSIS — N89.8 OTHER SPECIFIED NONINFLAMMATORY DISORDERS OF VAGINA: ICD-10-CM

## 2024-04-17 PROCEDURE — 56605 BIOPSY OF VULVA/PERINEUM: CPT

## 2024-04-17 NOTE — PROCEDURE
[Vulvar Biopsy] : Vulvar Biopsy [Time out performed] : Pre-procedure time out performed.  Patient's name, date of birth and procedure confirmed. [Consent Obtained] : Consent obtained [] : in the Perineum [Size of Biopsy Taken: ___ (mm)] : [unfilled]Umm [Local Anesthesia] : local anesthesia [____ Lidocaine w/o Epi] : ~VmL lidocaine without epinephrine [Sent to Pathology] : placed in buffered formalin and sent for pathology [Punch] : punch biopsy [Silver Nitrate] : silver nitrate [Pressure] : the application of direct pressure [Tolerated Well] : the patient tolerated the procedure well [No Complications] : there were no complications [de-identified] : right gluteal/perineal.

## 2024-04-26 DIAGNOSIS — J34.89 OTHER SPECIFIED DISORDERS OF NOSE AND NASAL SINUSES: ICD-10-CM

## 2024-04-26 RX ORDER — AMOXICILLIN AND CLAVULANATE POTASSIUM 875; 125 MG/1; MG/1
875-125 TABLET, COATED ORAL
Qty: 14 | Refills: 0 | Status: ACTIVE | COMMUNITY
Start: 2024-04-26 | End: 1900-01-01

## 2024-05-01 LAB — CORE LAB BIOPSY: NORMAL

## 2024-05-21 ENCOUNTER — APPOINTMENT (OUTPATIENT)
Dept: INTERNAL MEDICINE | Facility: CLINIC | Age: 59
End: 2024-05-21